# Patient Record
Sex: FEMALE | NOT HISPANIC OR LATINO | URBAN - METROPOLITAN AREA
[De-identification: names, ages, dates, MRNs, and addresses within clinical notes are randomized per-mention and may not be internally consistent; named-entity substitution may affect disease eponyms.]

---

## 2017-11-16 NOTE — PROCEDURE NOTE: CLINICAL
PROCEDURE NOTE: SLT OS. Diagnosis: POAG, Mild. Anesthesia: Topical. Prep: Alphagan 0.15%. Prior to treatment, risks/benefits/alternatives discussed including infection, loss of vision, hemorrhage, cataract, glaucoma, retinal tears or detachment. Lens:  SLT laser lens with goniosol. Power: 1.2mJ. Total applications: 970. Application 726 degrees. Patient tolerated procedure well. There were no complications. Post-op instructions given. Post-op IOP = 16 mmHg. Xu Henry

## 2021-03-25 ENCOUNTER — IMPORTED ENCOUNTER (OUTPATIENT)
Dept: URBAN - METROPOLITAN AREA CLINIC 1 | Facility: CLINIC | Age: 56
End: 2021-03-25

## 2021-03-25 PROBLEM — E11.9: Noted: 2021-03-25

## 2021-03-25 PROBLEM — H04.123: Noted: 2021-03-25

## 2021-03-25 PROBLEM — H25.043: Noted: 2021-03-25

## 2021-03-25 PROBLEM — H25.13: Noted: 2021-03-25

## 2021-03-25 PROBLEM — Z79.4: Noted: 2021-03-25

## 2021-03-25 PROBLEM — H43.812: Noted: 2021-03-25

## 2021-03-25 PROBLEM — H16.143: Noted: 2021-03-25

## 2021-03-25 PROCEDURE — 92015 DETERMINE REFRACTIVE STATE: CPT

## 2021-03-25 PROCEDURE — 99214 OFFICE O/P EST MOD 30 MIN: CPT

## 2021-03-25 NOTE — PATIENT DISCUSSION
1. [facility] Cataract OU- Visually Significant secondary to glare. Discussed the risks benefits alternatives and limitations of cataract surgery. The patient stated a full understanding and a desire to proceed with the procedure. The patient will need cataract measurements and to have any additional questions answered and start pre-operative eye drops as directed. Phaco PCL OD then [facility] did not seeOtherwise follow-up 6 month 10/dfe/glare2. DM Type II (Insulin) without sign of diabetic retinopathy and no blot heme on dilated retinal examination today OU No Macular Edema- Discussed the pathophysiology of diabetes and its effect on the eye and risk of blindness. Stressed the importance of strong glucose control. Advised of importance of at least yearly dilated examinations but to contact us immediately for any problems or concerns. 3. Dry Eyes OU- Recommend the frequent use of AT's BID OU routienly. 4.  PVD w/o Tear OS- RD precautions. MRx done do not release scheduling PhacoLetter to [address] for an appointment in [place]/H&P with Dr. Rosalina Fatima.

## 2021-05-13 ENCOUNTER — IMPORTED ENCOUNTER (OUTPATIENT)
Dept: URBAN - METROPOLITAN AREA CLINIC 1 | Facility: CLINIC | Age: 56
End: 2021-05-13

## 2021-05-13 PROBLEM — H25.813: Noted: 2021-05-13

## 2021-05-13 PROCEDURE — 92136 OPHTHALMIC BIOMETRY: CPT

## 2021-05-13 PROCEDURE — 92012 INTRM OPH EXAM EST PATIENT: CPT

## 2021-05-13 NOTE — PATIENT DISCUSSION
DM Type II (Insulin) without sign of diabetic retinopathy and no blot heme on dilated retinal examination today OU No Macular Edema- Discussed the pathophysiology of diabetes and its effect on the eye and risk of blindness. Stressed the importance of strong glucose control. Advised of importance of at least yearly dilated examinations but to contact us immediately for any problems or concerns. 3. Dry Eyes OU- Recommend the frequent use of AT's BID OU routienly. 4.  PVD w/o Tear OS- RD precautions. Return for an appointment for Return as scheduled with Dr. Claudeen Cobble.

## 2021-05-13 NOTE — PATIENT DISCUSSION
1.  Cataract OU:  Visually Significant discussed the risks benefits alternatives and limitations of cataract surgery. The patient stated a full understanding and a desire to proceed with the procedure. Discussed with patient if PO Gtts are more than $120 for all three combined when filling at their Pharmacy please call our office to request generic substitutions. Pt understands they will need glasses post-op to achieve their best corrected vision. Phaco PCL OD then OS Phaco PCL OD  Lifestyle Questionnaire Completed. 2.  DM Type II (Insulin) without sign of diabetic retinopathy and no blot heme on dilated retinal examination today OU No Macular Edema- Discussed the pathophysiology of diabetes and its effect on the eye and risk of blindness. Stressed the importance of strong glucose control. Advised of importance of at least yearly dilated examinations but to contact us immediately for any problems or concerns. 3. Dry Eyes OU- Recommend the frequent use of AT's BID OU routienly. 4.  PVD w/o Tear OS- RD precautions. Return for an appointment for Return as scheduled with Dr. Bhakti Hutson.

## 2021-06-07 ENCOUNTER — IMPORTED ENCOUNTER (OUTPATIENT)
Dept: URBAN - METROPOLITAN AREA CLINIC 1 | Facility: CLINIC | Age: 56
End: 2021-06-07

## 2021-06-07 PROBLEM — H25.811: Noted: 2021-06-07

## 2021-06-07 PROCEDURE — 92136 OPHTHALMIC BIOMETRY: CPT

## 2021-06-07 NOTE — PATIENT DISCUSSION
1. Cataract OD w/ PSC Component --  Visually Significant OD discussed the risks benefits alternatives and limitations of cataract surgery. The patient stated a full understanding and a desire to proceed with the procedure. Discussed with patient if PO Gtts are more than $120 for all three combined when filling at their Pharmacy please call our office to request generic substitutions. **[employer] [place]. Phaco PCL OD

## 2021-06-16 ENCOUNTER — IMPORTED ENCOUNTER (OUTPATIENT)
Dept: URBAN - METROPOLITAN AREA CLINIC 1 | Facility: CLINIC | Age: 56
End: 2021-06-16

## 2021-06-17 ENCOUNTER — IMPORTED ENCOUNTER (OUTPATIENT)
Dept: URBAN - METROPOLITAN AREA CLINIC 1 | Facility: CLINIC | Age: 56
End: 2021-06-17

## 2021-06-17 PROBLEM — Z96.1: Noted: 2021-06-17

## 2021-06-17 PROCEDURE — 99024 POSTOP FOLLOW-UP VISIT: CPT

## 2021-06-17 NOTE — PATIENT DISCUSSION
POD#1 CE/IOL OD (Standard) doing well. Use Prednisolone BID OD Prolensa Qdaily OD Ocuflox TID OD : Use all three gtts through completion of PO gtt chart regimen/ Per our instructions given to patient.   Post op Warnings Reiterated RTC as scheduled

## 2021-07-09 ENCOUNTER — IMPORTED ENCOUNTER (OUTPATIENT)
Dept: URBAN - METROPOLITAN AREA CLINIC 1 | Facility: CLINIC | Age: 56
End: 2021-07-09

## 2021-07-09 PROBLEM — Z96.1: Noted: 2021-07-09

## 2021-07-09 NOTE — PATIENT DISCUSSION
POW#3 Phaco/ PCL Standard ODPatient non compliance w/ PO gttsRestart Prolensa Qdaily OD (sample x2 given to use until gone). Will hold on Mrx until after OS done. Patient scheduled for Phaco/PCL OS in near future.

## 2021-08-23 ENCOUNTER — IMPORTED ENCOUNTER (OUTPATIENT)
Dept: URBAN - METROPOLITAN AREA CLINIC 1 | Facility: CLINIC | Age: 56
End: 2021-08-23

## 2021-08-23 PROBLEM — H25.812: Noted: 2021-08-23

## 2021-08-23 NOTE — PATIENT DISCUSSION
1.  Cataract OS Visually Significant secondary to glare discussed the risks benefits alternatives and limitations of cataract surgery. The patient stated a full understanding and a desire to proceed with the procedure. Discussed with patient if PO Gtts are more than $120 for all three combined when filling at their Pharmacy please call our office to request generic substitutions. Pt understands they will need glasses post-op to achieve their best corrected vision. Phaco PCL OS 2. POM#2  CE/IOL OD (Standard) doing well.   F/u as scheduled 2nd eye

## 2021-08-25 ENCOUNTER — IMPORTED ENCOUNTER (OUTPATIENT)
Dept: URBAN - METROPOLITAN AREA CLINIC 1 | Facility: CLINIC | Age: 56
End: 2021-08-25

## 2021-08-26 ENCOUNTER — IMPORTED ENCOUNTER (OUTPATIENT)
Dept: URBAN - METROPOLITAN AREA CLINIC 1 | Facility: CLINIC | Age: 56
End: 2021-08-26

## 2021-08-26 PROBLEM — Z96.1: Noted: 2021-08-26

## 2021-08-26 PROCEDURE — 99024 POSTOP FOLLOW-UP VISIT: CPT

## 2021-08-26 NOTE — PATIENT DISCUSSION
POD#1 CE/IOL OS (Standard) doing well. *Corneal Edema noted on today's exam Use Prednisolone BID OS Prolensa Qdaily OS Ocuflox TID OS : Use all three gtts through completion of PO gtt chart regimen/ Per our instructions given to patient.   Post op Warnings Reiterated RTC as scheduled

## 2021-09-16 ENCOUNTER — IMPORTED ENCOUNTER (OUTPATIENT)
Dept: URBAN - METROPOLITAN AREA CLINIC 1 | Facility: CLINIC | Age: 56
End: 2021-09-16

## 2021-09-16 PROBLEM — Z96.1: Noted: 2021-09-16

## 2021-09-16 PROCEDURE — 99024 POSTOP FOLLOW-UP VISIT: CPT

## 2021-09-16 NOTE — PATIENT DISCUSSION
POM#1 OS POM#3 OD CE/IOL OU (Standard) doing well. Continue Prednisolone OS BID until gone. Continue Prolensa OS QD until gone. Return for an appointment in April 2022 for 30 with Dr. Luis Pimentel.

## 2022-04-02 ASSESSMENT — TONOMETRY
OS_IOP_MMHG: 16
OD_IOP_MMHG: 23
OD_IOP_MMHG: 22
OS_IOP_MMHG: 16
OD_IOP_MMHG: 16
OS_IOP_MMHG: 20
OD_IOP_MMHG: 16
OS_IOP_MMHG: 20
OD_IOP_MMHG: 16
OS_IOP_MMHG: 19
OS_IOP_MMHG: 18

## 2022-04-02 ASSESSMENT — VISUAL ACUITY
OS_CC: 20/150
OS_SC: 20/40
OD_SC: J2
OS_SC: J3
OS_CC: 20/150
OD_CC: 20/30
OD_CC: 20/60
OD_CC: 20/40
OD_PH: SC 20/40
OS_PH: SC 20/40
OS_CC: 20/40-1
OD_CC: 20/50
OD_CC: 20/300
OD_CC: 20/30
OS_CC: 20/60
OS_CC: 20/80
OS_GLARE: 20/400
OD_SC: 20/300

## 2022-04-02 ASSESSMENT — KERATOMETRY
OD_K2POWER_DIOPTERS: 44.00
OS_K2POWER_DIOPTERS: 43.50
OD_K1POWER_DIOPTERS: 42.25
OD_AXISANGLE_DEGREES: 128
OS_AXISANGLE_DEGREES: 092
OD_AXISANGLE2_DEGREES: 038
OS_K1POWER_DIOPTERS: 42.50
OS_AXISANGLE2_DEGREES: 002

## 2023-06-02 NOTE — PATIENT DISCUSSION
Recommended artificial tears to use as directed. To non clinical    Pt is due for a physical with PCP.   Please schedule  Last Visit:11.16.21